# Patient Record
Sex: FEMALE | Race: WHITE | NOT HISPANIC OR LATINO | URBAN - METROPOLITAN AREA
[De-identification: names, ages, dates, MRNs, and addresses within clinical notes are randomized per-mention and may not be internally consistent; named-entity substitution may affect disease eponyms.]

---

## 2019-04-01 ENCOUNTER — EMERGENCY (EMERGENCY)
Facility: HOSPITAL | Age: 29
LOS: 1 days | Discharge: HOME | End: 2019-04-01
Attending: EMERGENCY MEDICINE | Admitting: EMERGENCY MEDICINE
Payer: SELF-PAY

## 2019-04-01 VITALS
TEMPERATURE: 98 F | DIASTOLIC BLOOD PRESSURE: 74 MMHG | OXYGEN SATURATION: 100 % | SYSTOLIC BLOOD PRESSURE: 110 MMHG | HEART RATE: 85 BPM | RESPIRATION RATE: 15 BRPM

## 2019-04-01 VITALS
TEMPERATURE: 98 F | SYSTOLIC BLOOD PRESSURE: 96 MMHG | HEART RATE: 78 BPM | DIASTOLIC BLOOD PRESSURE: 74 MMHG | OXYGEN SATURATION: 96 % | RESPIRATION RATE: 16 BRPM

## 2019-04-01 DIAGNOSIS — F10.129 ALCOHOL ABUSE WITH INTOXICATION, UNSPECIFIED: ICD-10-CM

## 2019-04-01 DIAGNOSIS — R41.82 ALTERED MENTAL STATUS, UNSPECIFIED: ICD-10-CM

## 2019-04-01 PROCEDURE — 99284 EMERGENCY DEPT VISIT MOD MDM: CPT | Mod: 25

## 2019-04-01 PROCEDURE — 99053 MED SERV 10PM-8AM 24 HR FAC: CPT

## 2019-04-01 NOTE — ED ADULT NURSE NOTE - OBJECTIVE STATEMENT
29 y/o F biba with friend from bar. pt unresponsive to pain at this time, friend reports patient was drinking alcohol. pt covered in vomit, scent of ETOH noted. pt with no head trauma or injuries noted. pt resting in stretcher near nurses station. IV Placed in L Hand by EMS and Zofran given PTA.

## 2019-04-01 NOTE — ED ADULT TRIAGE NOTE - TEMPERATURE IN FAHRENHEIT (DEGREES F)
ER Documentation


Chief Complaint


Chief Complaint


pt bib family with c/o abd pain starting 1 wk ago, worse since yesterday





HPI


23-year-old female presents with approximate one-week history of epigastric 

pain.  Is intermittently sharp.  She denies any vomiting.  She denies any 

fevers.  Patient believes she had her gallbladder removed but is uncertain.





ROS


All systems reviewed and are negative except as per history of present illness.





Medications


Home Meds


Active Scripts


Acetaminophen with Codeine (Acetaminophen-Cod #3 Tablet) 1 Each Tablet, 1 TAB 

PO Q6H Y for PAIN, #14 TAB


   Prov:AYLEEN GUZMAN MD         12/2/17


Pantoprazole* (Protonix*) 40 Mg Tablet.dr, 40 MG PO DAILY, #15 TAB


   Prov:AYLEEN GUZMAN MD         12/2/17


Ibuprofen* (Ibuprofen*) 600 Mg Tablet, 600 MG PO Q6, #30 TAB 0 Refills


   Prov:SAJI THAO MD         6/20/17





Allergies


Allergies:  


Coded Allergies:  


     No Known Allergy (Unverified , 5/25/17)





PMhx/Soc


History of Surgery:  Yes (s/p ERCP and cholecystectomy on 11-19-13)


Anesthesia Reaction:  No


Hx Neurological Disorder:  No


Hx Respiratory Disorders:  No


Hx Cardiac Disorders:  No


Hx Psychiatric Problems:  No


Hx Miscellaneous Medical Probl:  No (gallstones)


Hx Alcohol Use:  No


Hx Substance Use:  No


Hx Tobacco Use:  No


Smoking Status:  Never smoker





Physical Exam


Vitals





Vital Signs








  Date Time  Temp Pulse Resp B/P Pulse Ox O2 Delivery O2 Flow Rate FiO2


 


12/2/17 16:42 98.6 68 16 144/88 100   








Physical Exam


Const:  [] Alert, non-ill-appearing.


Head:   Atraumatic 


Eyes:    Normal Conjunctiva


ENT:    Normal External Ears, Nose and Mouth.


Neck:               Full range of motion..~ No meningismus.


Resp:    Clear to auscultation bilaterally


Cardio:    Regular rate and rhythm, no murmurs


Abd:    Soft, tenderness in the epigastric area.  No tenderness at McBurney's 

point no Leigh sign., non distended. Normal bowel sounds


Skin:    No petechiae or rashes


Back:    No midline or flank tenderness


Ext:    No cyanosis, or edema


Neur:    Awake and alert


Psych:    Normal Mood and Affect


Result Diagram:  


12/2/17 1725                                                                   

             12/2/17 1725





Results 24 hrs





 Laboratory Tests








Test


  12/2/17


17:25


 


White Blood Count 6.610^3/ul 


 


Red Blood Count 4.2910^6/ul 


 


Hemoglobin 13.2g/dl 


 


Hematocrit 39.7% 


 


Mean Corpuscular Volume 92.5fl 


 


Mean Corpuscular Hemoglobin 30.8pg 


 


Mean Corpuscular Hemoglobin


Concent 33.2g/dl 


 


 


Red Cell Distribution Width 13.0% 


 


Platelet Count 17288^3/UL 


 


Mean Platelet Volume 10.4fl 


 


Neutrophils % 71.1% 


 


Lymphocytes % 22.9% 


 


Monocytes % 4.1% 


 


Eosinophils % 1.5% 


 


Basophils % 0.2% 


 


Nucleated Red Blood Cells % 0.0/100WBC 


 


Neutrophils # 4.710^3/ul 


 


Lymphocytes # 1.510^3/ul 


 


Monocytes # 0.310^3/ul 


 


Eosinophils # 0.110^3/ul 


 


Basophils # 0.010^3/ul 


 


Nucleated Red Blood Cells # 0.010^3/ul 


 


Sodium Level 139mmol/L 


 


Potassium Level 4.1mmol/L 


 


Chloride Level 103mmol/L 


 


Carbon Dioxide Level 26mmol/L 


 


Anion Gap 14 


 


Blood Urea Nitrogen 12mg/dl 


 


Creatinine 0.58mg/dl 


 


Glucose Level 87mg/dl 


 


Calcium Level 8.9mg/dl 


 


Total Bilirubin 0.2mg/dl 


 


Direct Bilirubin 0.00mg/dl 


 


Indirect Bilirubin 0.2mg/dl 


 


Aspartate Amino Transf


(AST/SGOT) 36IU/L 


 


 


Alanine Aminotransferase


(ALT/SGPT) 51IU/L 


 


 


Alkaline Phosphatase 95IU/L 


 


Total Protein 7.0g/dl 


 


Albumin 4.2g/dl 


 


Globulin 2.80g/dl 


 


Albumin/Globulin Ratio 1.50 


 


Lipase 110U/L 








 Current Medications








 Medications


  (Trade)  Dose


 Ordered  Sig/Alyssia


 Route


 PRN Reason  Start Time


 Stop Time Status Last Admin


Dose Admin


 


 Ondansetron HCl


  (Zofran Odt)  8 mg  ONCE  STAT


 ODT


   12/2/17 17:11


 12/2/17 17:12 DC 12/2/17 17:25


 


 


 Acetaminophen


  (Tylenol Tab)  500 mg  ONCE  STAT


 PO


   12/2/17 17:11


 12/2/17 17:12 DC 12/2/17 17:25


 


 


 Famotidine


  (Pepcid)  20 mg  ONCE  ONCE


 PO


   12/2/17 17:30


 12/2/17 17:31 DC 12/2/17 17:25


 


 


 Morphine Sulfate


  (morphine)  4 mg  ONCE  STAT


 IV


   12/2/17 18:02


 12/2/17 18:03 DC 12/2/17 18:14


 











Procedures/MDM


CBC and CMP and lipase normal.  CT is negative.  Right upper quadrant 

ultrasound shows normal postoperative cholecystectomy changes without acute 

findings.





Chest X-ray 1V Interpreted by me:


Soft Tissue:                                               No acute 

abnormalities


Bones:                                                    No acute abnormalities


Mediastinum/Cardiac Silhouette/Lungs:     [No acute abnormalities] depression-

normal 1 view chest x-ray





Was given morphine 4 mg IV after pain patient given Pepcid 20 mg by mouth as 

well.  Patient presents with epigastric pain for last week without findings of 

choledocholithiasis, not aortic disease or cardiopulmonary disease.  Signs to 

suggest appendicitis, no signs to suggest UTI, pneumonia, additional emergent 

causes of presenting complaints.  Patient felt better after observation 

treatment.  She will be treated with Protonix and Tylenol 3, return precautions 

and primary care follow-up.  The patient was stable with no new complaints 

during the ER course. Clinically, there is no current evidence to suggest 

meningitis, sepsis, acute abdomen, pneumonia, acute coronary syndrome, 

pulmonary embolism, or any other emergent condition appearing to require 

further evaluation or hospitalization. The patient should certainly return for 

any new or worsening symptoms per the aftercare instructions. They should 

otherwise follow-up with her primary care doctor for reevaluation this week.





Departure


Diagnosis:  


 Primary Impression:  


 Abdominal pain


 Abdominal location:  epigastric  Qualified Code:  R10.13 - Epigastric pain


Condition:  Stable


Patient Instructions:  Abdominal Pain, Gastritis (Adult)





Additional Instructions:  


Examinations normal today.  May be gastritis.  We will treat for this.  Recheck 

for new or worsening symptoms or primary care doctor.











AYLEEN GUZMAN MD Dec 2, 2017 19:53
97.9

## 2019-04-01 NOTE — ED ADULT TRIAGE NOTE - CHIEF COMPLAINT QUOTE
Pt brought in by EMS after pt was noted to become unconscious while at a bar. Pt's roommate states pt was drinking shots of alcohol tonight. No visible signs of trauma noted. Given 4mg IV push zofran given and IV fluids given by EMS after she vomited on the ambulance. Pt

## 2021-07-25 NOTE — ED PROVIDER NOTE - NS ED ATTENDING STATEMENT MOD
1. Patient presents with functional oral phase of swallow with puree and thin liquids characterized by adequate bolus acceptance, A-P transit and oral clearance. 2. Patient presents with mild oral dysphagia with mechanical soft solids characterized by slow bolus mastication, slow A-P transit and oral lingual residue. Residue cleared with use of liquid wash. 3. Patient presents with functional pharyngeal phase of swallow with puree, mechanical soft solids and thin liquids characterized by adequate pharyngeal swallow trigger and present hyolaryngeal elevation upon digital palpation. No overt signs or symptoms of aspiration/penetration noted. Attending Only